# Patient Record
Sex: FEMALE | Race: WHITE | Employment: FULL TIME | URBAN - METROPOLITAN AREA
[De-identification: names, ages, dates, MRNs, and addresses within clinical notes are randomized per-mention and may not be internally consistent; named-entity substitution may affect disease eponyms.]

---

## 2017-03-09 ENCOUNTER — OFFICE VISIT (OUTPATIENT)
Dept: FAMILY MEDICINE CLINIC | Facility: CLINIC | Age: 37
End: 2017-03-09

## 2017-03-09 VITALS
DIASTOLIC BLOOD PRESSURE: 65 MMHG | BODY MASS INDEX: 21.39 KG/M2 | WEIGHT: 133.1 LBS | OXYGEN SATURATION: 98 % | HEART RATE: 68 BPM | SYSTOLIC BLOOD PRESSURE: 104 MMHG | RESPIRATION RATE: 18 BRPM | HEIGHT: 66 IN | TEMPERATURE: 98.2 F

## 2017-03-09 DIAGNOSIS — J02.0 STREP THROAT: Primary | ICD-10-CM

## 2017-03-09 LAB
S PYO AG THROAT QL: POSITIVE
VALID INTERNAL CONTROL?: YES

## 2017-03-09 RX ORDER — AMOXICILLIN 500 MG/1
500 CAPSULE ORAL 2 TIMES DAILY
Qty: 20 CAP | Refills: 0 | Status: SHIPPED | OUTPATIENT
Start: 2017-03-09 | End: 2017-03-19

## 2017-03-09 NOTE — PATIENT INSTRUCTIONS

## 2017-03-09 NOTE — MR AVS SNAPSHOT
Visit Information Date & Time Provider Department Dept. Phone Encounter #  
 3/9/2017  6:15 [facility] [address] [phone] [id number] Follow-up Instructions Return if symptoms worsen or fail to improve. Upcoming Health Maintenance Date Due DTaP/Tdap/Td series (1 - Tdap) 10/23/2002 PAP AKA CERVICAL CYTOLOGY 10/23/2002 INFLUENZA AGE 9 TO ADULT 8/1/2016 Allergies as of 3/9/2017  Review Complete On: 3/9/2017 By: Ronald Robbins 23, NP No Known Allergies Current Immunizations  Reviewed on 9/8/2014 No immunizations on file. Not reviewed this visit You Were Diagnosed With   
  
 Codes Comments Strep throat    -  Primary ICD-10-CM: J02.0 ICD-9-CM: 034.0 Vitals BP Pulse Temp Resp Height(growth percentile) Weight(growth percentile) 104/65 (BP 1 Location: Right arm, BP Patient Position: Sitting) 68 98.2 °F (36.8 °C) (Oral) 18 5' 6\" (1.676 m) 133 lb 1.6 oz (60.4 kg) LMP SpO2 BMI OB Status Smoking Status 02/01/2017 98% 21.48 kg/m2 Having regular periods Never Smoker Vitals History BMI and BSA Data Body Mass Index Body Surface Area  
 21.48 kg/m 2 1.68 m 2 Preferred Pharmacy Pharmacy Name Phone [address] [place] [id number] [facility] [phone] Your Updated Medication List  
  
   
This list is accurate as of: 3/9/17  6:21 PM.  Always use your most recent med list.  
  
  
  
  
 amoxicillin 500 mg capsule Commonly known as:  AMOXIL Take 1 Cap by mouth two (2) times a day for 10 days. MIRENA 20 mcg/24 hr (5 years) IUD Generic drug:  levonorgestrel 1 Each by IntraUTERine route once. Prescriptions Sent to Pharmacy Refills  
 amoxicillin (AMOXIL) 500 mg capsule 0 Sig: Take 1 Cap by mouth two (2) times a day for 10 days.   
 Class: Normal  
 Pharmacy: [facility] [id number] [place] [address] [place]  #: [phone] Route: Oral  
  
Follow-up Instructions Return if symptoms worsen or fail to improve. Patient Instructions Strep Throat: Care Instructions Your Care Instructions Strep throat is a bacterial infection that causes sudden, severe sore throat and fever. Strep throat, which is caused by bacteria called streptococcus, is treated with antibiotics. Sometimes a strep test is necessary to tell if the sore throat is caused by strep bacteria. Treatment can help ease symptoms and may prevent future problems. Follow-up care is a key part of your treatment and safety. Be sure to make and go to all appointments, and call your doctor if you are having problems. It's also a good idea to know your test results and keep a list of the medicines you take. How can you care for yourself at home? · Take your antibiotics as directed. Do not stop taking them just because you feel better. You need to take the full course of antibiotics. · Strep throat can spread to others until 24 hours after you begin taking antibiotics. During this time, you should avoid contact with other people at work or home, especially infants and children. Do not sneeze or cough on others, and wash your hands often. Keep your drinking glass and eating utensils separate from those of others, and wash these items well in hot, soapy water. · Gargle with warm salt water at least once each hour to help reduce swelling and make your throat feel better. Use 1 teaspoon of salt mixed in 8 fluid ounces of warm water. · Take an over-the-counter pain medication, such as acetaminophen (Tylenol), ibuprofen (Advil, Motrin), or naproxen (Aleve). Read and follow all instructions on the label. · Try an over-the-counter anesthetic throat spray or throat lozenges, which may help relieve throat pain. · Drink plenty of fluids. Fluids may help soothe an irritated throat.  Hot fluids, such as tea or soup, may help your throat feel better. · Eat soft solids and drink plenty of clear liquids. Flavored ice pops, ice cream, scrambled eggs, sherbet, and gelatin dessert (such as Jell-O) may also soothe the throat. · Get lots of rest. 
· Do not smoke, and avoid secondhand smoke. If you need help quitting, talk to your doctor about stop-smoking programs and medicines. These can increase your chances of quitting for good. · Use a vaporizer or humidifier to add moisture to the air in your bedroom. Follow the directions for cleaning the machine. When should you call for help? Call your doctor now or seek immediate medical care if: 
· You have a new or higher fever. · You have a fever with a stiff neck or severe headache. · You have new or worse trouble swallowing. · Your sore throat gets much worse on one side. · Your pain becomes much worse on one side of your throat. Watch closely for changes in your health, and be sure to contact your doctor if: 
· You are not getting better after 2 days (48 hours). · You do not get better as expected. Where can you learn more? Go to http://nando-shonda.info/. Enter K625 in the search box to learn more about \"Strep Throat: Care Instructions. \" Current as of: July 29, 2016 Content Version: 11.1 © 7473-7677 [employer]. Care instructions adapted under license by [employer] (which disclaims liability or warranty for this information). If you have questions about a medical condition or this instruction, always ask your healthcare professional. [place] [address] any warranty or liability for your use of this information. Introducing [facility]! Haile Zelaya introduces [employer] [facility] patient portal. Now you can access parts of your medical record, email your doctor's office, and request medication refills online.    
 
1. In your internet browser, go to [email or web address] 2. Click on the First Time User? Click Here link in the Sign In box. You will see the New Member Sign Up page. 3. Enter your [employer] Access Code exactly as it appears below. You will not need to use this code after youve completed the sign-up process. If you do not sign up before the expiration date, you must request a new code. · [employer] Access Code: [email or web address] Expires: 6/7/2017  6:09 PM 
 
4. Enter the last four digits of your Social Security Number (xxxx) and Date of Birth (mm/dd/yyyy) as indicated and click Submit. You will be taken to the next sign-up page. 5. Create a Ocisiont ID. This will be your [employer] login ID and cannot be changed, so think of one that is secure and easy to remember. 6. Create a [employer] password. You can change your password at any time. 7. Enter your Password Reset Question and Answer. This can be used at a later time if you forget your password. 8. Enter your e-mail address. You will receive e-mail notification when new information is available in [address] 9. Click Sign Up. You can now view and download portions of your medical record. 10. Click the Download Summary menu link to download a portable copy of your medical information. If you have questions, please visit the Frequently Asked Questions section of the [employer] website. Remember, [employer] is NOT to be used for urgent needs. For medical emergencies, dial 911. Now available from your iPhone and Android! Please provide this summary of care documentation to your next provider. Your primary care clinician is listed as Lilli Sheffield. If you have any questions after today's visit, please call [phone].

## 2017-03-09 NOTE — PROGRESS NOTES
HPI  Ms. Shonna Kim is a 29y.o. year old female, she is seen today for sore throat x 2 days. Patient exposed to 3year old son who tested positive for strep 2 days ago. Patient denies fever, chills, nvd, abdominal pain. Chief Complaint   Patient presents with    Sore Throat     Patient Active Problem List   Diagnosis Code    Pregnancy Z33.1    Active labor at term [id number]     History reviewed. No pertinent past medical history. Social History     Social History    Marital status:      Spouse name: N/A    Number of children: N/A    Years of education: N/A     Social History Main Topics    Smoking status: Never Smoker    Smokeless tobacco: Never Used    Alcohol use No    Drug use: No    Sexual activity: Yes     Partners: Male     Other Topics Concern    None     Social History Narrative     Past Surgical History:   Procedure Laterality Date    HX OTHER SURGICAL      laproscopy for endometriosis     History reviewed. No pertinent family history. No Known Allergies    REVIEW OF SYSTEMS  Per hpi    MEDICATIONS  Current Outpatient Prescriptions   Medication Sig    levonorgestrel (MIRENA) 20 mcg/24 hr (5 years) IUD 1 Each by IntraUTERine route once.  amoxicillin (AMOXIL) 500 mg capsule Take 1 Cap by mouth two (2) times a day for 10 days. No current facility-administered medications for this visit. PHYSICAL EXAM  Visit Vitals    /65 (BP 1 Location: Right arm, BP Patient Position: Sitting)    Pulse 68    Temp 98.2 °F (36.8 °C) (Oral)    Resp 18    Ht 5' 6\" (1.676 m)    Wt 133 lb 1.6 oz (60.4 kg)    SpO2 98%    BMI 21.48 kg/m2     Pupils, conjunctiva, nasal and oral mucosa are normal. EOM full. There is no facial weakness. Tongue is midline. TM wnl bilaterally. Posterior pharynx: +erythema, exudates  Neck is supple with anterior cervical lymph nodes. No carotid bruits. Lungs are clear to auscultation bilaterally. No rales, rhonchi or wheezes.     Heart examination reveals a normal S1 and S2 with no murmur or gallop. RRR. Gait and station are normal. There is no focal motor weakness. The patient is alert and oriented, with normal mental status and is able to give a good history. Mood is normal.  Skin is normal to inspection and palpation. Lab Results   Component Value Date/Time    Sodium 141 05/06/2016 02:40 AM    Potassium 3.6 05/06/2016 02:40 AM    Chloride 105 05/06/2016 02:40 AM    CO2 28 05/06/2016 02:40 AM    Anion gap 8 05/06/2016 02:40 AM    Glucose 125 05/06/2016 02:40 AM    BUN 11 05/06/2016 02:40 AM    Creatinine 0.64 05/06/2016 02:40 AM    BUN/Creatinine ratio 17 05/06/2016 02:40 AM    GFR est AA >60 05/06/2016 02:40 AM    GFR est non-AA >60 05/06/2016 02:40 AM    Calcium 8.8 05/06/2016 02:40 AM    Bilirubin, total 0.9 05/06/2016 02:40 AM    AST (SGOT) 13 05/06/2016 02:40 AM    Alk. phosphatase 48 05/06/2016 02:40 AM    Protein, total 7.2 05/06/2016 02:40 AM    Albumin 4.1 05/06/2016 02:40 AM    Globulin 3.1 05/06/2016 02:40 AM    A-G Ratio 1.3 05/06/2016 02:40 AM    ALT (SGPT) 13 05/06/2016 02:40 AM     Lab Results   Component Value Date/Time    WBC 16.0 05/06/2016 02:40 AM    HGB 12.4 05/06/2016 02:40 AM    HCT 37.1 05/06/2016 02:40 AM    PLATELET 381 35/58/9011 02:40 AM    MCV 91.4 05/06/2016 02:40 AM    Hgb, External 12.8 02/21/2014    Hct, External 38.5 02/21/2014     No results found for: CHOL, CHOLPOCT, CHOLX, CHLST, CHOLV, HDL, HDLPOC, LDL, LDLCPOC, NLDLCT, DLDL, LDLC, DLDLP, VLDLC, VLDL, TGL, TGLX, TRIGL, [id number], TRIGP, TGLPOCT, CHHD, CHHDX  No results found for: HBA1C, HGBE8, GZC1WKRB, GSW2VFXF, WUR6OOWP      ASSESSMENT/PLAN  Freddy Clifford was seen today for sore throat. Diagnoses and all orders for this visit:    Strep throat  -     AMB POC RAPID STREP A    Other orders  -     amoxicillin (AMOXIL) 500 mg capsule; Take 1 Cap by mouth two (2) times a day for 10 days. PLAN  1.  Strep + - treat with amoxicillin    Health Maintenance Due Topic Date Due    DTaP/Tdap/Td series (1 - Tdap) 10/23/2002    PAP AKA CERVICAL CYTOLOGY  10/23/2002    INFLUENZA AGE 9 TO ADULT  08/01/2016       Follow-up Disposition:  Return if symptoms worsen or fail to improve. Reviewed visit summary with the patient including pertinent labs, prescriptions, treatment, and diagnosis. The patient agrees and verbalized understanding and agreement with the plan. The nurse provided the patient and/or family with advanced directive information if needed and encouraged the patient to provide a copy to the office when available.